# Patient Record
Sex: MALE | Race: BLACK OR AFRICAN AMERICAN | Employment: PART TIME | URBAN - METROPOLITAN AREA
[De-identification: names, ages, dates, MRNs, and addresses within clinical notes are randomized per-mention and may not be internally consistent; named-entity substitution may affect disease eponyms.]

---

## 2022-09-12 ENCOUNTER — HOSPITAL ENCOUNTER (INPATIENT)
Age: 27
LOS: 1 days | Discharge: HOME | DRG: 501 | End: 2022-09-13
Attending: EMERGENCY MEDICINE | Admitting: INTERNAL MEDICINE
Payer: COMMERCIAL

## 2022-09-12 DIAGNOSIS — N48.21 ABSCESS, PENIS: ICD-10-CM

## 2022-09-12 DIAGNOSIS — N48.21 PENILE ABSCESS: Primary | ICD-10-CM

## 2022-09-12 LAB
A/G RATIO: 1.2 (ref 1.1–2.2)
ALBUMIN SERPL-MCNC: 4.3 G/DL (ref 3.4–5)
ALP BLD-CCNC: 73 U/L (ref 40–129)
ALT SERPL-CCNC: 11 U/L (ref 10–40)
ANION GAP SERPL CALCULATED.3IONS-SCNC: 10 MMOL/L (ref 3–16)
AST SERPL-CCNC: 21 U/L (ref 15–37)
BASOPHILS ABSOLUTE: 0 K/UL (ref 0–0.2)
BASOPHILS RELATIVE PERCENT: 0.2 %
BILIRUB SERPL-MCNC: 0.3 MG/DL (ref 0–1)
BUN BLDV-MCNC: 10 MG/DL (ref 7–20)
CALCIUM SERPL-MCNC: 9.3 MG/DL (ref 8.3–10.6)
CHLORIDE BLD-SCNC: 101 MMOL/L (ref 99–110)
CO2: 27 MMOL/L (ref 21–32)
CREAT SERPL-MCNC: 0.8 MG/DL (ref 0.9–1.3)
EOSINOPHILS ABSOLUTE: 0.1 K/UL (ref 0–0.6)
EOSINOPHILS RELATIVE PERCENT: 0.8 %
GFR AFRICAN AMERICAN: >60
GFR NON-AFRICAN AMERICAN: >60
GLUCOSE BLD-MCNC: 96 MG/DL (ref 70–99)
HCT VFR BLD CALC: 40.9 % (ref 40.5–52.5)
HEMOGLOBIN: 13.9 G/DL (ref 13.5–17.5)
LYMPHOCYTES ABSOLUTE: 1.7 K/UL (ref 1–5.1)
LYMPHOCYTES RELATIVE PERCENT: 19.1 %
MCH RBC QN AUTO: 31.7 PG (ref 26–34)
MCHC RBC AUTO-ENTMCNC: 33.9 G/DL (ref 31–36)
MCV RBC AUTO: 93.5 FL (ref 80–100)
MONOCYTES ABSOLUTE: 1 K/UL (ref 0–1.3)
MONOCYTES RELATIVE PERCENT: 10.8 %
NEUTROPHILS ABSOLUTE: 6.1 K/UL (ref 1.7–7.7)
NEUTROPHILS RELATIVE PERCENT: 69.1 %
PDW BLD-RTO: 14.3 % (ref 12.4–15.4)
PLATELET # BLD: 164 K/UL (ref 135–450)
PMV BLD AUTO: 9.4 FL (ref 5–10.5)
POTASSIUM SERPL-SCNC: 4 MMOL/L (ref 3.5–5.1)
RBC # BLD: 4.38 M/UL (ref 4.2–5.9)
SODIUM BLD-SCNC: 138 MMOL/L (ref 136–145)
TOTAL PROTEIN: 8 G/DL (ref 6.4–8.2)
WBC # BLD: 8.8 K/UL (ref 4–11)

## 2022-09-12 PROCEDURE — 2580000003 HC RX 258: Performed by: EMERGENCY MEDICINE

## 2022-09-12 PROCEDURE — 85025 COMPLETE CBC W/AUTO DIFF WBC: CPT

## 2022-09-12 PROCEDURE — 99285 EMERGENCY DEPT VISIT HI MDM: CPT

## 2022-09-12 PROCEDURE — 6360000002 HC RX W HCPCS: Performed by: EMERGENCY MEDICINE

## 2022-09-12 PROCEDURE — 96365 THER/PROPH/DIAG IV INF INIT: CPT

## 2022-09-12 PROCEDURE — 6370000000 HC RX 637 (ALT 250 FOR IP): Performed by: EMERGENCY MEDICINE

## 2022-09-12 PROCEDURE — 80053 COMPREHEN METABOLIC PANEL: CPT

## 2022-09-12 RX ORDER — HYDROCODONE BITARTRATE AND ACETAMINOPHEN 5; 325 MG/1; MG/1
1 TABLET ORAL ONCE
Status: COMPLETED | OUTPATIENT
Start: 2022-09-12 | End: 2022-09-12

## 2022-09-12 RX ORDER — DOXYCYCLINE 100 MG/1
100 CAPSULE ORAL ONCE
Status: COMPLETED | OUTPATIENT
Start: 2022-09-12 | End: 2022-09-12

## 2022-09-12 RX ADMIN — HYDROCODONE BITARTRATE AND ACETAMINOPHEN 1 TABLET: 5; 325 TABLET ORAL at 21:53

## 2022-09-12 RX ADMIN — CEFTRIAXONE 1000 MG: 1 INJECTION, POWDER, FOR SOLUTION INTRAMUSCULAR; INTRAVENOUS at 22:49

## 2022-09-12 RX ADMIN — DOXYCYCLINE 100 MG: 100 CAPSULE ORAL at 21:53

## 2022-09-12 NOTE — LETTER
[address] [facility]  [address] [address]  Phone: [phone]        September 13, 2022     Patient: Asha Tovar   YOB: 1995   Date of Visit: 9/12/2022       To Whom It May Concern:    Asha Tovar has been under medical care. It is my medical opinion that Asha Tovar may return to work 9/14/2022 so long as he still feeling better at that time and feels he is able to safely resume work. If you have any questions or concerns, please don't hesitate to call.     Sincerely,    John Kaminski DO MPH  Attending Hospitalist  [facility]

## 2022-09-13 VITALS
WEIGHT: 149.2 LBS | OXYGEN SATURATION: 100 % | HEART RATE: 50 BPM | SYSTOLIC BLOOD PRESSURE: 123 MMHG | RESPIRATION RATE: 16 BRPM | DIASTOLIC BLOOD PRESSURE: 81 MMHG | TEMPERATURE: 98 F | HEIGHT: 71 IN | BODY MASS INDEX: 20.89 KG/M2

## 2022-09-13 PROBLEM — N48.21 PENILE ABSCESS: Status: ACTIVE | Noted: 2022-09-13

## 2022-09-13 PROBLEM — Z86.19 HISTORY OF SYPHILIS: Status: ACTIVE | Noted: 2022-09-13

## 2022-09-13 PROBLEM — N48.21: Status: ACTIVE | Noted: 2022-09-13

## 2022-09-13 PROBLEM — Z21 ASYMPTOMATIC HIV INFECTION, WITH NO HISTORY OF HIV-RELATED ILLNESS (HCC): Status: ACTIVE | Noted: 2022-09-13

## 2022-09-13 PROCEDURE — 6370000000 HC RX 637 (ALT 250 FOR IP): Performed by: INTERNAL MEDICINE

## 2022-09-13 PROCEDURE — G0378 HOSPITAL OBSERVATION PER HR: HCPCS

## 2022-09-13 PROCEDURE — 2580000003 HC RX 258: Performed by: INTERNAL MEDICINE

## 2022-09-13 PROCEDURE — 1200000000 HC SEMI PRIVATE

## 2022-09-13 PROCEDURE — 99254 IP/OBS CNSLTJ NEW/EST MOD 60: CPT | Performed by: INTERNAL MEDICINE

## 2022-09-13 RX ORDER — SODIUM CHLORIDE 9 MG/ML
INJECTION, SOLUTION INTRAVENOUS CONTINUOUS
Status: DISCONTINUED | OUTPATIENT
Start: 2022-09-13 | End: 2022-09-13 | Stop reason: HOSPADM

## 2022-09-13 RX ORDER — DOXYCYCLINE 50 MG/1
100 CAPSULE ORAL EVERY 12 HOURS SCHEDULED
Status: DISCONTINUED | OUTPATIENT
Start: 2022-09-13 | End: 2022-09-13 | Stop reason: HOSPADM

## 2022-09-13 RX ORDER — SODIUM CHLORIDE 0.9 % (FLUSH) 0.9 %
5-40 SYRINGE (ML) INJECTION EVERY 12 HOURS SCHEDULED
Status: DISCONTINUED | OUTPATIENT
Start: 2022-09-13 | End: 2022-09-13 | Stop reason: HOSPADM

## 2022-09-13 RX ORDER — ONDANSETRON 2 MG/ML
4 INJECTION INTRAMUSCULAR; INTRAVENOUS EVERY 6 HOURS PRN
Status: DISCONTINUED | OUTPATIENT
Start: 2022-09-13 | End: 2022-09-13 | Stop reason: HOSPADM

## 2022-09-13 RX ORDER — OXYCODONE HYDROCHLORIDE AND ACETAMINOPHEN 5; 325 MG/1; MG/1
1 TABLET ORAL EVERY 8 HOURS PRN
Qty: 9 TABLET | Refills: 0 | Status: SHIPPED | OUTPATIENT
Start: 2022-09-13 | End: 2022-09-16

## 2022-09-13 RX ORDER — KETOROLAC TROMETHAMINE 30 MG/ML
30 INJECTION, SOLUTION INTRAMUSCULAR; INTRAVENOUS EVERY 6 HOURS PRN
Status: DISCONTINUED | OUTPATIENT
Start: 2022-09-13 | End: 2022-09-13 | Stop reason: HOSPADM

## 2022-09-13 RX ORDER — OXYCODONE HYDROCHLORIDE AND ACETAMINOPHEN 5; 325 MG/1; MG/1
1 TABLET ORAL EVERY 4 HOURS PRN
Status: DISCONTINUED | OUTPATIENT
Start: 2022-09-13 | End: 2022-09-13 | Stop reason: HOSPADM

## 2022-09-13 RX ORDER — MAGNESIUM HYDROXIDE/ALUMINUM HYDROXICE/SIMETHICONE 120; 1200; 1200 MG/30ML; MG/30ML; MG/30ML
30 SUSPENSION ORAL EVERY 6 HOURS PRN
Status: DISCONTINUED | OUTPATIENT
Start: 2022-09-13 | End: 2022-09-13 | Stop reason: HOSPADM

## 2022-09-13 RX ORDER — ACETAMINOPHEN 325 MG/1
650 TABLET ORAL EVERY 6 HOURS PRN
Status: DISCONTINUED | OUTPATIENT
Start: 2022-09-13 | End: 2022-09-13 | Stop reason: HOSPADM

## 2022-09-13 RX ORDER — ACETAMINOPHEN 325 MG/1
650 TABLET ORAL EVERY 4 HOURS PRN
Status: DISCONTINUED | OUTPATIENT
Start: 2022-09-13 | End: 2022-09-13 | Stop reason: HOSPADM

## 2022-09-13 RX ORDER — SODIUM CHLORIDE 9 MG/ML
INJECTION, SOLUTION INTRAVENOUS PRN
Status: DISCONTINUED | OUTPATIENT
Start: 2022-09-13 | End: 2022-09-13 | Stop reason: HOSPADM

## 2022-09-13 RX ORDER — SULFAMETHOXAZOLE AND TRIMETHOPRIM 800; 160 MG/1; MG/1
2 TABLET ORAL 2 TIMES DAILY
Qty: 28 TABLET | Refills: 0 | Status: SHIPPED | OUTPATIENT
Start: 2022-09-13 | End: 2022-09-20

## 2022-09-13 RX ORDER — OXYCODONE HYDROCHLORIDE AND ACETAMINOPHEN 5; 325 MG/1; MG/1
2 TABLET ORAL EVERY 4 HOURS PRN
Status: DISCONTINUED | OUTPATIENT
Start: 2022-09-13 | End: 2022-09-13 | Stop reason: HOSPADM

## 2022-09-13 RX ORDER — ONDANSETRON 4 MG/1
4 TABLET, ORALLY DISINTEGRATING ORAL EVERY 8 HOURS PRN
Status: DISCONTINUED | OUTPATIENT
Start: 2022-09-13 | End: 2022-09-13 | Stop reason: HOSPADM

## 2022-09-13 RX ORDER — POLYETHYLENE GLYCOL 3350 17 G/17G
17 POWDER, FOR SOLUTION ORAL DAILY PRN
Status: DISCONTINUED | OUTPATIENT
Start: 2022-09-13 | End: 2022-09-13 | Stop reason: HOSPADM

## 2022-09-13 RX ORDER — CEPHALEXIN 500 MG/1
500 CAPSULE ORAL 4 TIMES DAILY
Qty: 28 CAPSULE | Refills: 0 | Status: SHIPPED | OUTPATIENT
Start: 2022-09-13 | End: 2022-09-20

## 2022-09-13 RX ORDER — SODIUM CHLORIDE 0.9 % (FLUSH) 0.9 %
5-40 SYRINGE (ML) INJECTION PRN
Status: DISCONTINUED | OUTPATIENT
Start: 2022-09-13 | End: 2022-09-13 | Stop reason: HOSPADM

## 2022-09-13 RX ORDER — ACETAMINOPHEN 650 MG/1
650 SUPPOSITORY RECTAL EVERY 6 HOURS PRN
Status: DISCONTINUED | OUTPATIENT
Start: 2022-09-13 | End: 2022-09-13 | Stop reason: HOSPADM

## 2022-09-13 RX ADMIN — DOXYCYCLINE 100 MG: 50 CAPSULE ORAL at 10:04

## 2022-09-13 RX ADMIN — SODIUM CHLORIDE: 9 INJECTION, SOLUTION INTRAVENOUS at 08:00

## 2022-09-13 ASSESSMENT — PAIN SCALES - GENERAL: PAINLEVEL_OUTOF10: 0

## 2022-09-13 NOTE — CONSULTS
Infectious Diseases Inpatient Consult Note    Medical Student note - reviewed and modified, see Attending addendum at bottom    Reason for Consult:   Penile abscess, HIV infection  Requesting Physician:   Otto Peres DO  Primary Care Physician:  No primary care provider on file. History Obtained From:   Pt, EPIC    Admit Date: 9/12/2022  Hospital Day: 2    CHIEF COMPLAINT:       Chief Complaint   Patient presents with    Abscess     penis       HISTORY OF PRESENT ILLNESS:      33 yo M  PMHx HIV (diagnosed 2/7/22, on [place]), hx latent syphilis (treated in 2021, and Penicillin IM 8/26/22), hx urethral gonorrhea (about 8 yrs ago), hx rectal chlamydia (treated). Follows with Dr. Rodarte at [place] [address] on 7/20 - [address] on 7/20 - <20    Presented to [address] [place] [address] ED on 9/12 d/t abscess to glans penis. Began 4 days prior, after manual stimulation x5, no lotions etc. Grew since then, pt decided to come in when it appeared to have a white head. No drainage. No fevers, pain, dysuria, urgency, frequency. Sexually active with the same male partner for last 5 yrs. Vitals within normal limits. Physical exam significant for 1.5cm fluctuant tense mass on glans and border of shaft. Erythema overlying. No urethral discharge, tenderness, vesicles. L inguinal lymphadenopathy present. WBC 8.8 (ANC 6.1), Cr 0.8. Pt transferred to [facility] for further workup of penile abscess, and started on Ceftriaxone IV.    9/13 - seen by urology. Abscess ruptured in 4AM, relieving pain and pressure. Draining purulent fluid, minimal surrounding fluctuance/ erythema. When I saw him, he said it was still draining a little, and started bleeding. States he feels no pain, unless it rubbed against thigh or underwear. Denies fever, chills, n/v, abd pain, rashes. Tolerating PO, tolerating abx.     Past Medical History:    HIV, multiple STIs    Past Surgical History:    None     Current Medications:     sodium chloride flush  5-40 mL IntraVENous 2 times per day    cefTRIAXone (ROCEPHIN) IV  1,000 mg IntraVENous Q24H    doxycycline monohydrate  100 mg Oral 2 times per day       Allergies:  Patient has no known allergies. Social History:    TOBACCO:    Never  ETOH:    Occasionally   DRUGS:   None  MARITAL STATUS:   Single  OCCUPATION:    for [employer]    Patient lives with mother    Family History:   No immunodeficiency    REVIEW OF SYSTEMS:    No fever / chills / sweats. No weight loss. No visual change, eye pain, eye discharge. No oral lesion, sore throat, dysphagia. Denies cough / sputum. Denies chest pain, palpitations. Denies n / v / abd pain +loose stools for 1 wk starting Sept 3, after a night of drinking, also possibly 2/2 Penicillin. Today is the first day of solid stool  Denies dysuria or change in urinary function. Denies joint swelling or pain. No myalgia, arthralgia. Denies skin changes, itching  Denies focal weakness, sensory change or other neurologic symptom    Denies new / worse depression, psychiatric symptoms    PHYSICAL EXAM:      Vitals:    /81   Pulse 50   Temp 98 °F (36.7 °C) (Oral)   Resp 16   Ht 5' 11\" (1.803 m)   Wt 149 lb 3.2 oz (67.7 kg)   SpO2 100%   BMI 20.81 kg/m²     GENERAL: No apparent distress.     HEENT: Membranes moist, no oral lesion, PERRL  NECK:  Supple, no lymphadenopathy  LUNGS: Clear b/l, no rales, no dullness  CARDIAC: RRR, no murmur appreciated  ABD:  + BS, soft / NT.  deferred until male chaperone in room  EXT:  No rash, no edema, no lesions  NEURO: No focal neurologic findings  PSYCH: Orientation, sensorium, mood normal  LINES:  Peripheral iv    DATA:    Lab Results   Component Value Date    WBC 8.8 09/12/2022    HGB 13.9 09/12/2022    HCT 40.9 09/12/2022    MCV 93.5 09/12/2022     09/12/2022     Lab Results   Component Value Date    CREATININE 0.8 (L) 09/12/2022    BUN 10 09/12/2022     09/12/2022    K 4.0 09/12/2022     09/12/2022    CO2 27 09/12/2022 Xander Vega, MS-IV    Addendum to Medical Student Consult note:  Pt seen,examined and evaluated. I have independently performed history, physical exam, lab and data review. I have determined assessment and plan as documented by student Tez Montero). 33 yo man   Dx HIV 2/2022, RF MSM, sees Dr Laura Pemberton [facility]), on [place], last CD4 298, VL <20 (7/20/22)  Dx syphilis 8/2022, received benzathine PCN 2.4 million IM no 8/26    Present with penile pain. He reports masturbation 4-5 days ago and after this developed pain, swelling, a lesion  No assoc fever    In [place] ED - afeb, WBC 8.8  Admit , started Ceftriaxone   Spontaneous drainage  Seen by , expressed further fluid    IMP/  Penile abscess  HIV    REC/  Discharge on po Bactrim ds bid x 10 days  Call in 2 days my office to update me on condition  Cont Biktarvy    F/u with [facility] ID for HIV care, syphilis f/u     Medical Decision Making: The following items were considered in medical decision making:  Discussion of patient care with other providers  Reviewed clinical lab tests  Reviewed radiology tests  Reviewed other diagnostic tests/interventions  Microbiology cultures and other micro tests reviewed      Risk of Complications/Morbidity: High   Illness(es)/ Infection present that pose threat to bodily function. There is potential for severe exacerbation of infection/side effects of treatment.   Therapy requires intensive monitoring for antimicrobial agent toxicity    Discussed with pt, visitor, JUVENCIO Ruff MD

## 2022-09-13 NOTE — H&P
Hospital Medicine History & Physical      PCP: No primary care provider on file. Date of Admission: 9/12/2022    Date of Service: Pt seen/examined on 09/13/22 and Admitted to Inpatient with expected LOS greater than two midnights due to medical therapy. Chief Complaint:  Abscess of the penis    History Of Present Illness:      Dilcia Brian is a 32 y.o. male with past medical history as below, including HIV and syphilis, who presents with penile abscess that started 4-5 days ago. No fever. No urinary symptoms. No nausea or vomiting. Otherwise has sarbjit feeling pretty well. He was also treated one time recently for syphilis because his partner had syphilis. The abscess started draining today without any intervention. Past Medical History:      History reviewed. No pertinent past medical history. Past Surgical History:      History reviewed. No pertinent surgical history. Medications Prior to Admission:      Prior to Admission medications    Medication Sig Start Date End Date Taking? Authorizing Provider   Bictegravir-Emtricitab-Tenofov (BIKTARVY PO) Take by mouth   Yes Historical Provider, MD   naproxen (NAPROSYN) 500 MG tablet Take 1 tablet by mouth 2 times daily as needed for Pain (with meals) 5/18/18 5/25/18  Raj Gowers, MD       Allergies:  Patient has no known allergies. Social History:      The patient currently lives in private residence     TOBACCO:   reports that he has never smoked. He has never used smokeless tobacco.  ETOH:   reports no history of alcohol use. Family History:      Reviewed in detail and positive as follows:    History reviewed. No pertinent family history. REVIEW OF SYSTEMS:   Pertinent positives as noted in the HPI. All other systems reviewed and negative.     PHYSICAL EXAM:    /68   Pulse 52   Temp 97.6 °F (36.4 °C) (Oral)   Resp 16   Ht 5' 11\" (1.803 m)   Wt 149 lb 3.2 oz (67.7 kg)   SpO2 98%   BMI 20.81 kg/m²     General appearance: No apparent distress, appears stated age and cooperative. HEENT: Normal cephalic, atraumatic without obvious deformity. Pupils equal, round, and reactive to light. Extra ocular muscles intact. Conjunctivae/corneas clear. Neck: Supple, with full range of motion. No jugular venous distention. Trachea midline. Respiratory:  Normal respiratory effort. Clear to auscultation, bilaterally without Rales/Wheezes/Rhonchi. Cardiovascular: Regular rate and rhythm with normal S1/S2 without murmurs, rubs or gallops. Abdomen: Soft, non-tender, non-distended with normal bowel sounds. Musculoskelatal: No clubbing, cyanosis or edema bilaterally. Full range of motion without deformity. Skin: Skin color, texture, turgor normal.  No rashes or lesions. Neurologic:  Neurovascularly intact without any focal sensory/motor deficits. Cranial nerves: II-XII intact, grossly non-focal.  Psychiatric: Alert and oriented, thought content appropriate, normal insight    Labs:     Recent Labs     09/12/22  2244   WBC 8.8   HGB 13.9   HCT 40.9        Recent Labs     09/12/22  2244      K 4.0      CO2 27   BUN 10   CREATININE 0.8*   CALCIUM 9.3     Recent Labs     09/12/22  2244   AST 21   ALT 11   BILITOT 0.3   ALKPHOS 73     No results for input(s): INR in the last 72 hours. No results for input(s): Kathyrn Belch in the last 72 hours.     Urinalysis:    No results found for: Cathlene Shank, BACTERIA, RBCUA, BLOODU, SPECGRAV, GLUCOSEU    Studies:  No orders to display       ASSESSMENT:    Active Hospital Problems    Diagnosis Date Noted    Penile abscess [N48.21] 09/13/2022     Priority: Medium    Abscess, penis [N48.21] 09/13/2022     Priority: Medium       PLAN:    HIV on Biktarvy  Recent hx of Syphilis, completed injection x 1  Urology, ID consulted  Continue Rocephin and doxycycline started in ED    DVT Prophylaxis: Lovenox  Diet: Diet NPO  Code Status: Full Code    PT/OT Eval Status:     Dispo - Inpatient      Shae Black DO

## 2022-09-13 NOTE — CONSULTS
Urology Attending Consult Note      Reason for Consultation: Penile abscess    History: 33 yo M HIV positive who presented to [facility] ER with complaint of abscess on penis. Initially started 4 days ago and grew in size yesterday, prompting him to seek medical attention. No fevers, issues urinating. At time of ER visit abscess was intact and fluctuant. He was transferred to [address] [place] [address] for further treatment. Family History, Social History, Review of Systems:  Reviewed and agreed to as per chart    Vitals:  /70   Pulse 50   Temp 98.2 °F (36.8 °C) (Oral)   Resp 16   Ht 5' 11\" (1.803 m)   Wt 149 lb 3.2 oz (67.7 kg)   SpO2 100%   BMI 20.81 kg/m²   Temp  Av.9 °F (36.6 °C)  Min: 97.6 °F (36.4 °C)  Max: 98.2 °F (36.8 °C)  No intake or output data in the 24 hours ending 22 1006      Physical:  Well developed, well nourished in no acute distress  Mood indicates no abnormalities. Pt doesnt appear depressed  Orientated to time and place  Neck is supple, trachea is midline  Respiratory effort is normal  Cardiovascular show no extremity swelling  Abdomen no masses or hernias are palpated, there is no tenderness. Liver and Spleen appear normal.  Skin show no abnormal lesions  Lymph nodes are not palpated in the inguinal, neck, or axillary area. Male :  Penis circumcised. Open 1.5cm wound on glans/shaft draining purulent fluid. No major surrounding erythema or fluctuance.   Urethral meatus is normal in size and location  Scrotum appears normal and both testicles appear normal in size and location        Labs:  WBC:    Lab Results   Component Value Date/Time    WBC 8.8 2022 10:44 PM     Hemoglobin/Hematocrit:    Lab Results   Component Value Date/Time    HGB 13.9 2022 10:44 PM    HCT 40.9 2022 10:44 PM     BMP:    Lab Results   Component Value Date/Time     2022 10:44 PM    K 4.0 2022 10:44 PM     2022 10:44 PM    CO2 27 2022 10:44 PM    BUN 10 09/12/2022 10:44 PM    LABALBU 4.3 09/12/2022 10:44 PM    CREATININE 0.8 09/12/2022 10:44 PM    CALCIUM 9.3 09/12/2022 10:44 PM    GFRAA >60 09/12/2022 10:44 PM    LABGLOM >60 09/12/2022 10:44 PM     PT/INR:  No results found for: PROTIME, INR  PTT:  No results found for: APTT[APTT  Antibiotic Therapy: Rocephin    Impression/Plan: 33 yo M HIV positive admitted with penile abscess.    -Patient reporting that abscess ruptured this AM. Since, he has been feeling much better. Less pain and pressure reported.  -Small open abscess noted on glans/shaft draining purulent fluid. No major surrounding fluctuance/erythema. I expressed as much fluid as possible at bedside  -Continue with abx and ID consult  -Likely no surgical intervention today as abscess is now spontaneously draining.  OK to eat from our standpoint  -Call with any questions     WILMAN Villeda

## 2022-09-13 NOTE — CARE COORDINATION
Case Management Assessment  Initial Evaluation    Date/Time of Evaluation: 9/13/2022 11:35 AM  Assessment Completed by: Miriam Gonzáles RN    If patient is discharged prior to next notation, then this note serves as note for discharge by case management. Patient Name: Makenzie López                   YOB: 1995  Diagnosis: Penile abscess [N48.21]  Abscess, penis [N48.21]                   Date / Time: 9/12/2022  7:49 PM    Patient Admission Status: Inpatient     Current PCP: No primary care provider on file. PCP verified by CM? Yes    Chart Reviewed: Yes      Patient Interviewed: Yes   Family Interviewed:  No   Patient Orientation: Alert and Oriented    Patient Cognition: Alert  History Provided by: Patient    Hospitalization in the last 30 days (Readmission):  No    If yes, Readmission Assessment in CM Navigator will be completed. Advance Directives:     Code Status: Full Code       Discharge Planning  Patient lives with: Spouse/Significant Other Type of Home: House Patient Support Systems include: Spouse/Significant Other   Current Financial resources:    Current community resources:    Current services prior to admission: None   Type of Home Care services:       ADLS  Prior functional level: Independent in ADLs/IADLs  Current functional level: Independent in ADLs/IADLs    PT AM-PAC:   /24  OT AM-PAC:   /24    Family can provide assistance at DC: Yes  Would you like Case Management to discuss the discharge plan with any other family members/significant others, and if so, who?  No  Plans to Return to Present Housing: Yes  Other Identified Issues/Barriers to RETURNING to current housing:   Potential Assistance needed at discharge: N/A  Patient expects to discharge to: [address] [place] [address] for transportation at discharge: Family    Financial  Payor: Kristel Basurto / Plan: Amy Henson / Product Type: *No Product type* /     Does insurance require precert for SNF: Yes    Potential assistance Purchasing Medications:    Meds-to-Beds request:        [address], [address] Donnice Frankel [phone] [address] [facility] [address]  Phone: [phone] Fax: [phone]      Factors facilitating achievement of predicted outcomes: Family support    Barriers to discharge: Medical complications    Additional Case Management Notes: Pt from home, independent PTA. Pt family will transport home. Awaiting ID recs for atbx. CM anticipates no home needs. Will continue to follow for dc planning. The Plan for Transition of Care is related to the following treatment goals of Penile abscess [N48.21]  Abscess, penis [H10.26]    IF APPLICABLE: The Patient and/or patient representative Galen and his family were provided with a choice of provider and agrees with the discharge plan. Freedom of choice list with basic dialogue that supports the patient's individualized plan of care/goals and shares the quality data associated with the providers was provided to: Patient   Patient Representative Name:       The Patient and/or Patient Representative Agree with the Discharge Plan?  Yes    Gee Carpenter RN  Case Management Department  Ph: [phone] Fax: [phone]

## 2022-09-13 NOTE — DISCHARGE INSTRUCTIONS
CHANGE DRESSING AS NEEDED-  SEE US IN 1 WEEK AT THE [facility] UROLOGY GROUP OFFICE: DR Stephanie Dumont PA.  [phone]

## 2022-09-13 NOTE — PROGRESS NOTES
Patient alert and oriented x4. arrived from [facility] ED; VSS   Ambulating independently as tolerating; No skin issues with exception to abscess/blister on head of penis no discharge at admission but pt called out stating that it burst.  Gave surgical underwear and gauze to keep discharge intact until MD assessed.   New peripheral IV placed in L AC;

## 2022-09-13 NOTE — PROGRESS NOTES
4 Eyes Admission Assessment     I agree as the admission nurse that 2 RN's have performed a thorough Head to Toe Skin Assessment on the patient. ALL assessment sites listed below have been assessed on admission. Areas assessed by both nurses:   [x]   Head, Face, and Ears   [x]   Shoulders, Back, and Chest  [x]   Arms, Elbows, and Hands   [x]   Coccyx, Sacrum, and Ischium - abscess/blister on near head of penis. [x]   Legs, Feet, and Heels        Does the Patient have Skin Breakdown?   No         Quinn Prevention initiated:  No   Wound Care Orders initiated:  NA      [facility] nurse consulted for Pressure Injury (Stage 3,4, Unstageable, DTI, NWPT, and Complex wounds) or Quinn score 18 or lower:  No      Nurse 1 eSignature: Electronically signed by Jeannine Saab RN on 9/13/22 at 9:07 AM EDT    **SHARE this note so that the co-signing nurse is able to place an eSignature**    Nurse 2 eSignature: {Esignature:[id number]}

## 2022-09-13 NOTE — ED PROVIDER NOTES
[facility] EMERGENCY DEPT VISIT      Patient Identification  Uli Heck is a 32 y.o. male. Chief Complaint   Abscess (penis)      History of Present Illness: This is a  32 y.o. male who presents ambulatory  to the ED with complaints of abscess to glans of penis. Onset 4 days ago, got much larger today. No drainage. Treated for latent syphilis with IM PCN 8/26. Has HIV on Biktarvy. No fever. No trouble urinating. No testicular pain. He has male partners. History reviewed. No pertinent past medical history. History reviewed. No pertinent surgical history. No current facility-administered medications for this encounter.     Current Outpatient Medications:     Bictegravir-Emtricitab-Tenofov (BIKTARVY PO), Take by mouth, Disp: , Rfl:     naproxen (NAPROSYN) 500 MG tablet, Take 1 tablet by mouth 2 times daily as needed for Pain (with meals), Disp: 14 tablet, Rfl: 0    No Known Allergies    Social History     Socioeconomic History    Marital status: Single     Spouse name: Not on file    Number of children: Not on file    Years of education: Not on file    Highest education level: Not on file   Occupational History    Not on file   Tobacco Use    Smoking status: Never    Smokeless tobacco: Never   Substance and Sexual Activity    Alcohol use: No    Drug use: No    Sexual activity: Not on file   Other Topics Concern    Not on file   Social History Narrative    Not on file     Social Determinants of Health     Financial Resource Strain: Not on file   Food Insecurity: Not on file   Transportation Needs: Not on file   Physical Activity: Not on file   Stress: Not on file   Social Connections: Not on file   Intimate Partner Violence: Not on file   Housing Stability: Not on file       Nursing Notes Reviewed      ROS:  General: no fever  ENT: no sinus congestion, no sore throat  RESP: no cough, no shortness of breath  CARDIAC: no chest pain  GI: no abdominal pain, no vomiting, no diarrhea  : no dysuria, no hematuria, no urgency, no frequency, no retention  Musculoskeletal: no arthralgia, no myalgia, no back pain,  no joint swelling  NEURO: no headache, no numbness, no weakness, no dizziness  DERM: no rash, + erythema, no ecchymosis, no wounds      PHYSICAL EXAM:  GENERAL APPEARANCE: Rusty Daley is in no acute respiratory distress. Awake and alert. VITAL SIGNS:   ED Triage Vitals [09/12/22 1945]   Enc Vitals Group      /72      Heart Rate 62      Resp 16      Temp 97.8 °F (36.6 °C)      Temp Source Oral      SpO2 100 %      Weight 149 lb 3.2 oz (67.7 kg)      Height 5' 11\" (1.803 m)      Head Circumference       Peak Flow       Pain Score       Pain Loc       Pain Edu? Excl. in [address]? HEAD: Normocephalic, atraumatic. EYES:  Extraocular muscles are intact. Conjunctivas are pink. Negative scleral icterus. ENT:  Mucous membranes are moist.  Pharynx without erythema or exudates. NECK: Nontender and supple. CHEST: Clear to auscultation bilaterally. No rales, rhonchi, or wheezing. HEART:  Regular rate and rhythm. No murmurs. Strong and equal pulses in the upper and lower extremities. ABDOMEN: Soft,  nondistended, positive bowel sounds. abdomen is nontender. No guarding. : circumsized male. 1.5cm firm fluctuant tense abscess on glans and border of shaft at 2 oclock position. No drainage. Erythema limited to abscess site. No urethral discharge. No testicular tenderness. No vesicles. +left inguinal lymphadenopathy  MUSCULOSKELETAL:  Active range of motion of the upper and lower extremities. No edema. NEUROLOGICAL: Awake, alert and oriented x 3. Power intact in the upper and lower extremities. DERMATOLOGIC: No petechiae, rashes, or ecchymoses. ED COURSE AND MEDICAL DECISION MAKING:      Radiology:  All plain films have been evaluated by myself. They may have been overread by radiologist as noted in chart.  Other radiologic studies (i.e. CT, MRI, ultrasounds, etc ) have been interpreted by radiologist. No orders to display       Labs:  Results for orders placed or performed during the hospital encounter of 09/12/22   CBC with Auto Differential   Result Value Ref Range    WBC 8.8 4.0 - 11.0 K/uL    RBC 4.38 4.20 - 5.90 M/uL    Hemoglobin 13.9 13.5 - 17.5 g/dL    Hematocrit 40.9 40.5 - 52.5 %    MCV 93.5 80.0 - 100.0 fL    MCH 31.7 26.0 - 34.0 pg    MCHC 33.9 31.0 - 36.0 g/dL    RDW 14.3 12.4 - 15.4 %    Platelets 005 043 - 795 K/uL    MPV 9.4 5.0 - 10.5 fL    Neutrophils % 69.1 %    Lymphocytes % 19.1 %    Monocytes % 10.8 %    Eosinophils % 0.8 %    Basophils % 0.2 %    Neutrophils Absolute 6.1 1.7 - 7.7 K/uL    Lymphocytes Absolute 1.7 1.0 - 5.1 K/uL    Monocytes Absolute 1.0 0.0 - 1.3 K/uL    Eosinophils Absolute 0.1 0.0 - 0.6 K/uL    Basophils Absolute 0.0 0.0 - 0.2 K/uL   Comprehensive Metabolic Panel   Result Value Ref Range    Sodium 138 136 - 145 mmol/L    Potassium 4.0 3.5 - 5.1 mmol/L    Chloride 101 99 - 110 mmol/L    CO2 27 21 - 32 mmol/L    Anion Gap 10 3 - 16    Glucose 96 70 - 99 mg/dL    BUN 10 7 - 20 mg/dL    Creatinine 0.8 (L) 0.9 - 1.3 mg/dL    GFR Non-African American >60 >60    GFR African American >60 >60    Calcium 9.3 8.3 - 10.6 mg/dL    Total Protein 8.0 6.4 - 8.2 g/dL    Albumin 4.3 3.4 - 5.0 g/dL    Albumin/Globulin Ratio 1.2 1.1 - 2.2    Total Bilirubin 0.3 0.0 - 1.0 mg/dL    Alkaline Phosphatase 73 40 - 129 U/L    ALT 11 10 - 40 U/L    AST 21 15 - 37 U/L       Treatment in the department:  Patient received   Medications   HYDROcodone-acetaminophen (NORCO) 5-325 MG per tablet 1 tablet (1 tablet Oral Given 9/12/22 2153)   doxycycline monohydrate (MONODOX) capsule 100 mg (100 mg Oral Given 9/12/22 2153)   cefTRIAXone (ROCEPHIN) 1,000 mg in dextrose 5 % 50 mL IVPB mini-bag (0 mg IntraVENous Stopped 9/13/22 0013)         Medical decision making:  10:13 PM EDT  Discussed case with Dr Letitia Lawrence, urology,  who recommends admission, ID consult, NPO after midnight for possible OR drainage    I spoke with Dr. Nic Hernandez. We thoroughly discussed the history, physical exam, laboratory and imaging studies, as well as, emergency department course. Based upon that discussion, we've decided to admit David Baker for further observation and evaluation of Galen Garsia's cellulitis. As I have deemed necessary from their history, physical, and studies, I have considered and evaluated David Baker for the following diagnoses: DEEP SPACE INFECTIONS, DEEP VENOUS THROMBOSIS, EBENEZER'S GANGRENE, SEPSIS, and TOXIC SHOCK SYNDROME. Clinical Impression:  1. Penile abscess        Dispo:  Patient will be admitted at this time. Patient was informed of this decision and agrees with plan. I have discussed lab and xray findings with patient and they understand. Questions were answered to the best of my ability. Followup:  No follow-up provider specified. Discharge vitals:  Blood pressure 111/72, pulse 62, temperature 97.8 °F (36.6 °C), temperature source Oral, resp. rate 16, height 5' 11\" (1.803 m), weight 149 lb 3.2 oz (67.7 kg), SpO2 100 %. Prescriptions given:   New Prescriptions    No medications on file         This chart was created using dragon voice recognition software.         Madina Arevalo MD  09/13/22 3351

## 2022-09-13 NOTE — DISCHARGE SUMMARY
Hospital Medicine Discharge Summary    Patient: Merna Pierce     Gender: male  : 1995   Age: 32 y.o. MRN: [id number]    Admitting Physician: Sonia Villa DO  Discharge Physician: Sonia Villa DO    Code Status: Full Code     Admit Date: 2022   Discharge Date:  2022     Disposition:  Home     Discharge Diagnoses: Active Hospital Problems    Diagnosis Date Noted    Penile abscess [N48.21] 2022     Priority: Medium    Abscess, penis [N48.21] 2022     Priority: Medium       Outpatient to do list:     1) Follow-up appointments:    Primary care provider  ID    Condition at Discharge: [address] Course:   Patient was admitted for evaluation of penile abscess. Urology and ID consulted. Drained spontaneously, does not need further intervention. Will dc on antibiotics and follow-up with ID. Also needs to complete penicillin series for syphilis. Discussed this and he says he will call ID to make arrangements. Additional findings or notes to primary provider:  None at this time    Discharge Medications:   Current Discharge Medication List        Current Discharge Medication List        Current Discharge Medication List        CONTINUE these medications which have NOT CHANGED    Details   Bictegravir-Emtricitab-Tenofov (BIKTARVY PO) Take by mouth      naproxen (NAPROSYN) 500 MG tablet Take 1 tablet by mouth 2 times daily as needed for Pain (with meals)  Qty: 14 tablet, Refills: 0           Current Discharge Medication List          Discharge ROS:  A complete review of systems was asked and negative.     Discharge Exam:    /81   Pulse 50   Temp 98 °F (36.7 °C) (Oral)   Resp 16   Ht 5' 11\" (1.803 m)   Wt 149 lb 3.2 oz (67.7 kg)   SpO2 100%   BMI 20.81 kg/m²   General appearance:  NAD  HEENT:   Normal cephalic, atraumatic, moist mucous membranes, no oropharyngeal erythema or exudate  Neck: Supple, trachea midline, no anterior cervical or SC LAD  Heart[de-identified] Normal s1/s2, RRR, no murmurs, gallops, or rubs. No leg edema  Lungs:  Clear to auscultation bilaterally, no wheeze, rales or rhonchi, no use of accessory musclesNormal respiratory effort. Clear to auscultation, bilaterally without Rales/Wheezes/Rhonchi. Abdomen: Soft, non-tender, non-distended, bowel sounds present, no masses  Musculoskeletal:  No clubbing, no cyanosis, or edema  Skin: No lesion or masses  Neurologic:  Neurovascularly intact without any focal sensory/motor deficits. Cranial nerves: II-XII intact, grossly non-focal.  Psychiatric:  Alert and oriented, thought content appropriate    Labs: For convenience and continuity at follow-up the following most recent labs are provided:    Lab Results   Component Value Date/Time    WBC 8.8 09/12/2022 10:44 PM    HGB 13.9 09/12/2022 10:44 PM    HCT 40.9 09/12/2022 10:44 PM    MCV 93.5 09/12/2022 10:44 PM     09/12/2022 10:44 PM     09/12/2022 10:44 PM    K 4.0 09/12/2022 10:44 PM     09/12/2022 10:44 PM    CO2 27 09/12/2022 10:44 PM    BUN 10 09/12/2022 10:44 PM    CREATININE 0.8 09/12/2022 10:44 PM    CALCIUM 9.3 09/12/2022 10:44 PM    ALKPHOS 73 09/12/2022 10:44 PM    ALT 11 09/12/2022 10:44 PM    AST 21 09/12/2022 10:44 PM    BILITOT 0.3 09/12/2022 10:44 PM    LABALBU 4.3 09/12/2022 10:44 PM     No results found for: INR    Radiology:  No results found. The patient was seen and examined on day of discharge and this discharge summary is in conjunction with any daily progress note from day of discharge. Time Spent on discharge is more than 30 minutes in the examination, evaluation, counseling and review of medications and discharge plan.           Teodoro Weldon DO   9/13/2022

## 2022-09-13 NOTE — ED NOTES
[address] here to transport pt to [facility] room [address] [facility]  09/13/22 7923